# Patient Record
Sex: FEMALE | Race: WHITE | URBAN - METROPOLITAN AREA
[De-identification: names, ages, dates, MRNs, and addresses within clinical notes are randomized per-mention and may not be internally consistent; named-entity substitution may affect disease eponyms.]

---

## 2022-12-23 ENCOUNTER — OFFICE VISIT (OUTPATIENT)
Dept: URBAN - METROPOLITAN AREA CLINIC 113 | Facility: CLINIC | Age: 52
End: 2022-12-23
Payer: COMMERCIAL

## 2022-12-23 VITALS
HEART RATE: 93 BPM | WEIGHT: 253 LBS | SYSTOLIC BLOOD PRESSURE: 140 MMHG | HEIGHT: 65 IN | BODY MASS INDEX: 42.15 KG/M2 | TEMPERATURE: 97.7 F | DIASTOLIC BLOOD PRESSURE: 90 MMHG | RESPIRATION RATE: 20 BRPM

## 2022-12-23 DIAGNOSIS — R10.84 GENERALIZED ABDOMINAL PAIN: ICD-10-CM

## 2022-12-23 DIAGNOSIS — Z12.11 COLON CANCER SCREENING: ICD-10-CM

## 2022-12-23 DIAGNOSIS — K21.9 GASTROESOPHAGEAL REFLUX DISEASE WITHOUT ESOPHAGITIS: ICD-10-CM

## 2022-12-23 DIAGNOSIS — K80.20 CALCULUS OF GALLBLADDER WITHOUT CHOLECYSTITIS WITHOUT OBSTRUCTION: ICD-10-CM

## 2022-12-23 PROCEDURE — 99204 OFFICE O/P NEW MOD 45 MIN: CPT | Performed by: STUDENT IN AN ORGANIZED HEALTH CARE EDUCATION/TRAINING PROGRAM

## 2022-12-23 RX ORDER — PANTOPRAZOLE SODIUM 40 MG/1
1 TABLET TABLET, DELAYED RELEASE ORAL TWICE A DAY
Qty: 180 TABLET | Refills: 1

## 2022-12-23 RX ORDER — SODIUM PICOSULFATE, MAGNESIUM OXIDE, AND ANHYDROUS CITRIC ACID 10; 3.5; 12 MG/160ML; G/160ML; G/160ML
TAKE 160ML AS DIRECTED LIQUID ORAL
Qty: 1 KIT | Refills: 0 | OUTPATIENT
Start: 2022-12-23 | End: 2022-12-25

## 2022-12-23 NOTE — HPI-TODAY'S VISIT:
Initial visit 12/23/2022 Ms. Michael is a 52-year-old female with a medical history of reflux, migraine, obesity who presents to the GI clinic for evaluation of abdominal pain.  She describes pain in the epigastrium, left upper quadrant, bilateral lower quadrants.  She does admit to having acid reflux.  She admits to using a significant amount of NSAIDs in the form of Excedrin, BC powder, ibuprofen for treatment of back pain and headaches.  She denies weight loss associated with her pain.  She has alternating diarrhea and constipation.  She has never had a colonoscopy.  No known family history of colon cancer.  She denies rectal bleeding, dysphagia/odynophagia. She states she was seen in the ER at [facility] in September 2022 for abdominal pain and she was told she had a hiatal hernia and reflux as well as gallstones.  She went to a bariatric surgeon to discuss gastric sleeve surgery as well as cholecystectomy and was recommended to have her gallbladder removed, she went to see gastroenterology consultants of [place] who had recommended EGD prior to cholecystectomy.  Her insurance however did not allow her to get these procedures done and so she follows up in our clinic to discuss endoscopic evaluation. Labs 9/22/22: WBC 10.5, hemoglobin 15.8, platelet 358, INR 0.99, sodium 136 potassium 4.1, BUN 23, creatinine 0.88, lipase 112, LFTs within normal limits. CT abdomen/pelvis with contrast 9/22/2022 performed for epigastric abdominal pain notable for nonobstructing stone in the left renal pelvis measuring 1.5 cm with mild enhancement of surrounding ureter, cholelithiasis. Chest x-ray AP 9/22/2022 notable for no acute findings.

## 2022-12-23 NOTE — PHYSICAL EXAM GASTROINTESTINAL
Abdomen , soft, tender to palpation in the abdomen diffusely, Ortiz sign negative, nondistended , no guarding or rigidity , no masses palpable , normal bowel sounds , Liver and Spleen , no hepatomegaly present , no hepatosplenomegaly , liver nontender , spleen not palpable  90

## 2023-01-06 ENCOUNTER — TELEPHONE ENCOUNTER (OUTPATIENT)
Dept: URBAN - METROPOLITAN AREA CLINIC 113 | Facility: CLINIC | Age: 53
End: 2023-01-06

## 2023-01-10 ENCOUNTER — TELEPHONE ENCOUNTER (OUTPATIENT)
Dept: URBAN - METROPOLITAN AREA CLINIC 113 | Facility: CLINIC | Age: 53
End: 2023-01-10

## 2023-01-10 RX ORDER — SODIUM PICOSULFATE, MAGNESIUM OXIDE, AND ANHYDROUS CITRIC ACID 10; 3.5; 12 MG/160ML; G/160ML; G/160ML
TAKE 160ML AS DIRECTED LIQUID ORAL
Qty: 1 KIT | Refills: 0 | OUTPATIENT
Start: 2023-01-11 | End: 2023-01-13

## 2023-01-11 ENCOUNTER — ERX REFILL RESPONSE (OUTPATIENT)
Dept: URBAN - METROPOLITAN AREA CLINIC 113 | Facility: CLINIC | Age: 53
End: 2023-01-11

## 2023-01-11 RX ORDER — POLYETHYLENE GLYCOL 3350, SODIUM CHLORIDE, SODIUM BICARBONATE, POTASSIUM CHLORIDE 420; 11.2; 5.72; 1.48 G/4L; G/4L; G/4L; G/4L
AS DIRECTED POWDER, FOR SOLUTION ORAL AS DIRECTED
Qty: 1 KIT | Refills: 0 | OUTPATIENT

## 2023-01-11 RX ORDER — SODIUM PICOSULFATE, MAGNESIUM OXIDE, AND ANHYDROUS CITRIC ACID 10; 3.5; 12 MG/160ML; G/160ML; G/160ML
TAKE 160ML AS DIRECTED LIQUID ORAL
Qty: 1 KIT | Refills: 0 | OUTPATIENT

## 2023-01-13 ENCOUNTER — TELEPHONE ENCOUNTER (OUTPATIENT)
Dept: URBAN - METROPOLITAN AREA CLINIC 113 | Facility: CLINIC | Age: 53
End: 2023-01-13

## 2023-01-13 PROBLEM — 266435005: Status: ACTIVE | Noted: 2022-12-23

## 2023-01-31 ENCOUNTER — OFFICE VISIT (OUTPATIENT)
Dept: URBAN - METROPOLITAN AREA MEDICAL CENTER 2 | Facility: MEDICAL CENTER | Age: 53
End: 2023-01-31
Payer: COMMERCIAL

## 2023-01-31 DIAGNOSIS — K31.89 ACQUIRED DEFORMITY OF DUODENUM: ICD-10-CM

## 2023-01-31 DIAGNOSIS — R10.13 ABDOMINAL DISCOMFORT, EPIGASTRIC: ICD-10-CM

## 2023-01-31 DIAGNOSIS — D12.3 ADENOMA OF TRANSVERSE COLON: ICD-10-CM

## 2023-01-31 DIAGNOSIS — Z12.11 COLON CANCER SCREENING: ICD-10-CM

## 2023-01-31 PROCEDURE — 43239 EGD BIOPSY SINGLE/MULTIPLE: CPT | Performed by: STUDENT IN AN ORGANIZED HEALTH CARE EDUCATION/TRAINING PROGRAM

## 2023-01-31 PROCEDURE — 45385 COLONOSCOPY W/LESION REMOVAL: CPT | Performed by: STUDENT IN AN ORGANIZED HEALTH CARE EDUCATION/TRAINING PROGRAM

## 2023-01-31 RX ORDER — POLYETHYLENE GLYCOL 3350, SODIUM CHLORIDE, SODIUM BICARBONATE, POTASSIUM CHLORIDE 420; 11.2; 5.72; 1.48 G/4L; G/4L; G/4L; G/4L
AS DIRECTED POWDER, FOR SOLUTION ORAL AS DIRECTED
Qty: 1 KIT | Refills: 0 | Status: ACTIVE | COMMUNITY

## 2023-01-31 RX ORDER — PANTOPRAZOLE SODIUM 40 MG/1
1 TABLET TABLET, DELAYED RELEASE ORAL TWICE A DAY
Qty: 180 TABLET | Refills: 1 | Status: ACTIVE | COMMUNITY

## 2023-02-01 ENCOUNTER — ERX REFILL RESPONSE (OUTPATIENT)
Dept: URBAN - METROPOLITAN AREA CLINIC 113 | Facility: CLINIC | Age: 53
End: 2023-02-01

## 2023-02-01 RX ORDER — PANTOPRAZOLE SODIUM 40 MG/1
1 TABLET TABLET, DELAYED RELEASE ORAL TWICE A DAY
Qty: 180 TABLET | Refills: 1 | OUTPATIENT

## 2023-02-01 RX ORDER — PANTOPRAZOLE SODIUM 40 MG/1
TAKE 1 TABLET BY MOUTH TWICE A DAY FOR 90 DAYS TABLET, DELAYED RELEASE ORAL
Qty: 180 TABLET | Refills: 1 | OUTPATIENT

## 2023-02-15 ENCOUNTER — TELEPHONE ENCOUNTER (OUTPATIENT)
Dept: URBAN - METROPOLITAN AREA CLINIC 113 | Facility: CLINIC | Age: 53
End: 2023-02-15

## 2023-02-15 PROBLEM — 70342003: Status: ACTIVE | Noted: 2022-12-23

## 2023-03-20 ENCOUNTER — OFFICE VISIT (OUTPATIENT)
Dept: URBAN - METROPOLITAN AREA CLINIC 113 | Facility: CLINIC | Age: 53
End: 2023-03-20

## 2023-03-21 ENCOUNTER — OFFICE VISIT (OUTPATIENT)
Dept: URBAN - METROPOLITAN AREA CLINIC 113 | Facility: CLINIC | Age: 53
End: 2023-03-21

## 2023-03-28 ENCOUNTER — OFFICE VISIT (OUTPATIENT)
Dept: URBAN - METROPOLITAN AREA CLINIC 113 | Facility: CLINIC | Age: 53
End: 2023-03-28

## 2023-03-28 RX ORDER — PANTOPRAZOLE SODIUM 40 MG/1
TAKE 1 TABLET BY MOUTH TWICE A DAY FOR 90 DAYS TABLET, DELAYED RELEASE ORAL
Qty: 180 TABLET | Refills: 1 | Status: ACTIVE | COMMUNITY

## 2023-03-28 RX ORDER — POLYETHYLENE GLYCOL 3350, SODIUM CHLORIDE, SODIUM BICARBONATE, POTASSIUM CHLORIDE 420; 11.2; 5.72; 1.48 G/4L; G/4L; G/4L; G/4L
AS DIRECTED POWDER, FOR SOLUTION ORAL AS DIRECTED
Qty: 1 KIT | Refills: 0 | Status: ACTIVE | COMMUNITY

## 2023-04-21 ENCOUNTER — OFFICE VISIT (OUTPATIENT)
Dept: URBAN - METROPOLITAN AREA CLINIC 113 | Facility: CLINIC | Age: 53
End: 2023-04-21
Payer: COMMERCIAL

## 2023-04-21 VITALS
RESPIRATION RATE: 18 BRPM | BODY MASS INDEX: 39.62 KG/M2 | SYSTOLIC BLOOD PRESSURE: 109 MMHG | HEART RATE: 77 BPM | TEMPERATURE: 97.5 F | HEIGHT: 65 IN | DIASTOLIC BLOOD PRESSURE: 76 MMHG | WEIGHT: 237.8 LBS

## 2023-04-21 DIAGNOSIS — K59.00 CONSTIPATION, UNSPECIFIED CONSTIPATION TYPE: ICD-10-CM

## 2023-04-21 DIAGNOSIS — K21.9 GASTROESOPHAGEAL REFLUX DISEASE, UNSPECIFIED WHETHER ESOPHAGITIS PRESENT: ICD-10-CM

## 2023-04-21 PROBLEM — 14760008: Status: ACTIVE | Noted: 2023-04-21

## 2023-04-21 PROBLEM — 235595009: Status: ACTIVE | Noted: 2023-04-21

## 2023-04-21 PROCEDURE — 99214 OFFICE O/P EST MOD 30 MIN: CPT | Performed by: STUDENT IN AN ORGANIZED HEALTH CARE EDUCATION/TRAINING PROGRAM

## 2023-04-21 RX ORDER — POLYETHYLENE GLYCOL 3350, SODIUM CHLORIDE, SODIUM BICARBONATE, POTASSIUM CHLORIDE 420; 11.2; 5.72; 1.48 G/4L; G/4L; G/4L; G/4L
AS DIRECTED POWDER, FOR SOLUTION ORAL AS DIRECTED
Qty: 1 KIT | Refills: 0 | Status: ON HOLD | COMMUNITY

## 2023-04-21 RX ORDER — PANTOPRAZOLE SODIUM 40 MG/1
TAKE 1 TABLET BY MOUTH TWICE A DAY FOR 90 DAYS TABLET, DELAYED RELEASE ORAL
Qty: 180 TABLET | Refills: 1 | Status: ON HOLD | COMMUNITY

## 2023-04-21 RX ORDER — OMEPRAZOLE 40 MG/1
1 CAPSULE 30 MINUTES BEFORE MORNING AND EVENING MEAL CAPSULE, DELAYED RELEASE ORAL TWICE A DAY
Qty: 180 | Refills: 0 | OUTPATIENT
Start: 2023-04-21

## 2023-04-21 NOTE — HPI-TODAY'S VISIT:
Initial visit 12/23/2022 Ms. Michael is a 52-year-old female with a medical history of reflux, migraine, obesity who presents to the GI clinic for evaluation of abdominal pain.  She describes pain in the epigastrium, left upper quadrant, bilateral lower quadrants.  She does admit to having acid reflux.  She admits to using a significant amount of NSAIDs in the form of Excedrin, BC powder, ibuprofen for treatment of back pain and headaches.  She denies weight loss associated with her pain.  She has alternating diarrhea and constipation.  She has never had a colonoscopy.  No known family history of colon cancer.  She denies rectal bleeding, dysphagia/odynophagia. She states she was seen in the ER at [facility] in September 2022 for abdominal pain and she was told she had a hiatal hernia and reflux as well as gallstones.  She went to a bariatric surgeon to discuss gastric sleeve surgery as well as cholecystectomy and was recommended to have her gallbladder removed, she went to see gastroenterology consultants of [place] who had recommended EGD prior to cholecystectomy.  Her insurance however did not allow her to get these procedures done and so she follows up in our clinic to discuss endoscopic evaluation. Labs 9/22/22: WBC 10.5, hemoglobin 15.8, platelet 358, INR 0.99, sodium 136 potassium 4.1, BUN 23, creatinine 0.88, lipase 112, LFTs within normal limits. CT abdomen/pelvis with contrast 9/22/2022 performed for epigastric abdominal pain notable for nonobstructing stone in the left renal pelvis measuring 1.5 cm with mild enhancement of surrounding ureter, cholelithiasis. Chest x-ray AP 9/22/2022 notable for no acute findings. . Follow-up visit 4/21/2023 EGD 1/31/2023 performed for epigastric abdominal pain: Normal-appearing esophagus, Z-line regular at 33 cm, 5 cm hiatal hernia present, minimal inflammation in the gastric antrum, normal-appearing duodenum.  Duodenal biopsies with minute foci of pancreatic heterotopia with no other pathologic findings, unremarkable gastric biopsies. Colonoscopy 1/31/2023 performed for screening: JULI normal, TI normal, 8 mm pedunculated polyp removed in the transverse colon with cold snare, multiple medium mouth diverticula found in the sigmoid and descending colon, nonbleeding grade 1 hemorrhoids found on retroflexion.  Polyp consistent with tubular adenoma. She has had follow-up with Dr. Art for planned procedure.  Patient underwent robotic sleeve gastrectomy, hiatal hernia repair as well as cholecystectomy on 4/5/23. She states that she still has some chest pain with wheezing after meals, she was started on famotidine 40mg QHS. She complains of constipation with some straining during defecation.

## 2023-07-27 ENCOUNTER — OFFICE VISIT (OUTPATIENT)
Dept: URBAN - METROPOLITAN AREA CLINIC 113 | Facility: CLINIC | Age: 53
End: 2023-07-27

## 2023-08-08 ENCOUNTER — ERX REFILL RESPONSE (OUTPATIENT)
Dept: URBAN - METROPOLITAN AREA CLINIC 113 | Facility: CLINIC | Age: 53
End: 2023-08-08

## 2023-08-08 RX ORDER — OMEPRAZOLE 40 MG/1
TAKE 1 CAPSULE 30 MINUTES BEFORE MORNING AND EVENING MEAL ORALLY CAPSULE, DELAYED RELEASE ORAL
Qty: 60 CAPSULE | Refills: 2 | OUTPATIENT

## 2023-08-08 RX ORDER — OMEPRAZOLE 40 MG/1
1 CAPSULE 30 MINUTES BEFORE MORNING AND EVENING MEAL CAPSULE, DELAYED RELEASE ORAL TWICE A DAY
Qty: 180 | Refills: 0 | OUTPATIENT

## 2023-09-18 ENCOUNTER — DASHBOARD ENCOUNTERS (OUTPATIENT)
Age: 53
End: 2023-09-18

## 2023-09-18 ENCOUNTER — OFFICE VISIT (OUTPATIENT)
Dept: URBAN - METROPOLITAN AREA CLINIC 113 | Facility: CLINIC | Age: 53
End: 2023-09-18
Payer: COMMERCIAL

## 2023-09-18 VITALS
DIASTOLIC BLOOD PRESSURE: 68 MMHG | HEART RATE: 66 BPM | BODY MASS INDEX: 37.02 KG/M2 | WEIGHT: 222.2 LBS | HEIGHT: 65 IN | RESPIRATION RATE: 18 BRPM | SYSTOLIC BLOOD PRESSURE: 104 MMHG | TEMPERATURE: 97.1 F

## 2023-09-18 DIAGNOSIS — R10.13 EPIGASTRIC PAIN: ICD-10-CM

## 2023-09-18 DIAGNOSIS — K21.9 GASTROESOPHAGEAL REFLUX DISEASE, UNSPECIFIED WHETHER ESOPHAGITIS PRESENT: ICD-10-CM

## 2023-09-18 PROCEDURE — 99214 OFFICE O/P EST MOD 30 MIN: CPT | Performed by: STUDENT IN AN ORGANIZED HEALTH CARE EDUCATION/TRAINING PROGRAM

## 2023-09-18 RX ORDER — POLYETHYLENE GLYCOL 3350, SODIUM CHLORIDE, SODIUM BICARBONATE, POTASSIUM CHLORIDE 420; 11.2; 5.72; 1.48 G/4L; G/4L; G/4L; G/4L
AS DIRECTED POWDER, FOR SOLUTION ORAL AS DIRECTED
Qty: 1 KIT | Refills: 0 | Status: ON HOLD | COMMUNITY

## 2023-09-18 RX ORDER — PANTOPRAZOLE SODIUM 40 MG/1
TAKE 1 TABLET BY MOUTH TWICE A DAY FOR 90 DAYS TABLET, DELAYED RELEASE ORAL
Qty: 180 TABLET | Refills: 1 | Status: ON HOLD | COMMUNITY

## 2023-09-18 RX ORDER — OMEPRAZOLE 40 MG/1
TAKE 1 CAPSULE 30 MINUTES BEFORE MORNING AND EVENING MEAL ORALLY CAPSULE, DELAYED RELEASE ORAL
Qty: 60 CAPSULE | Refills: 2 | Status: ACTIVE | COMMUNITY

## 2023-09-18 NOTE — HPI-TODAY'S VISIT:
Initial visit 12/23/2022 Ms. Michael is a 52-year-old female with a medical history of reflux, migraine, obesity who presents to the GI clinic for evaluation of abdominal pain.  She describes pain in the epigastrium, left upper quadrant, bilateral lower quadrants.  She does admit to having acid reflux.  She admits to using a significant amount of NSAIDs in the form of Excedrin, BC powder, ibuprofen for treatment of back pain and headaches.  She denies weight loss associated with her pain.  She has alternating diarrhea and constipation.  She has never had a colonoscopy.  No known family history of colon cancer.  She denies rectal bleeding, dysphagia/odynophagia. She states she was seen in the ER at [facility] in September 2022 for abdominal pain and she was told she had a hiatal hernia and reflux as well as gallstones.  She went to a bariatric surgeon to discuss gastric sleeve surgery as well as cholecystectomy and was recommended to have her gallbladder removed, she went to see gastroenterology consultants of [place] who had recommended EGD prior to cholecystectomy.  Her insurance however did not allow her to get these procedures done and so she follows up in our clinic to discuss endoscopic evaluation. Labs 9/22/22: WBC 10.5, hemoglobin 15.8, platelet 358, INR 0.99, sodium 136 potassium 4.1, BUN 23, creatinine 0.88, lipase 112, LFTs within normal limits. CT abdomen/pelvis with contrast 9/22/2022 performed for epigastric abdominal pain notable for nonobstructing stone in the left renal pelvis measuring 1.5 cm with mild enhancement of surrounding ureter, cholelithiasis. Chest x-ray AP 9/22/2022 notable for no acute findings. . Follow-up visit 4/21/2023 EGD 1/31/2023 performed for epigastric abdominal pain: Normal-appearing esophagus, Z-line regular at 33 cm, 5 cm hiatal hernia present, minimal inflammation in the gastric antrum, normal-appearing duodenum.  Duodenal biopsies with minute foci of pancreatic heterotopia with no other pathologic findings, unremarkable gastric biopsies. Colonoscopy 1/31/2023 performed for screening: JULI normal, TI normal, 8 mm pedunculated polyp removed in the transverse colon with cold snare, multiple medium mouth diverticula found in the sigmoid and descending colon, nonbleeding grade 1 hemorrhoids found on retroflexion.  Polyp consistent with tubular adenoma. She has had follow-up with Dr. Art for planned procedure.  Patient underwent robotic sleeve gastrectomy, hiatal hernia repair as well as cholecystectomy on 4/5/23. She states that she still has some chest pain with wheezing after meals, she was started on famotidine 40mg QHS. She complains of constipation with some straining during defecation. . Follow-up visit 9/18/2023 Patient states that she continues to have epigastric pain and reflux that is worse in the postprandial state.  She has been using PPI twice daily without symptom improve. She has lost 37 pounds since her gastric sleeve, hiatal hernia repair and cholecysetctomy. She also complains of post-prandial regurgitation, this is worse at night and happens if she falls asleep soon after eating.

## 2023-09-25 ENCOUNTER — TELEPHONE ENCOUNTER (OUTPATIENT)
Dept: URBAN - METROPOLITAN AREA CLINIC 113 | Facility: CLINIC | Age: 53
End: 2023-09-25

## 2023-09-28 ENCOUNTER — TELEPHONE ENCOUNTER (OUTPATIENT)
Dept: URBAN - METROPOLITAN AREA CLINIC 113 | Facility: CLINIC | Age: 53
End: 2023-09-28

## 2023-09-29 ENCOUNTER — LAB OUTSIDE AN ENCOUNTER (OUTPATIENT)
Dept: URBAN - METROPOLITAN AREA CLINIC 113 | Facility: CLINIC | Age: 53
End: 2023-09-29

## 2023-09-29 ENCOUNTER — OUT OF OFFICE VISIT (OUTPATIENT)
Dept: URBAN - METROPOLITAN AREA SURGERY CENTER 25 | Facility: SURGERY CENTER | Age: 53
End: 2023-09-29
Payer: COMMERCIAL

## 2023-09-29 ENCOUNTER — TELEPHONE ENCOUNTER (OUTPATIENT)
Dept: URBAN - METROPOLITAN AREA CLINIC 113 | Facility: CLINIC | Age: 53
End: 2023-09-29

## 2023-09-29 ENCOUNTER — CLAIMS CREATED FROM THE CLAIM WINDOW (OUTPATIENT)
Dept: URBAN - METROPOLITAN AREA CLINIC 4 | Facility: CLINIC | Age: 53
End: 2023-09-29
Payer: COMMERCIAL

## 2023-09-29 DIAGNOSIS — K31.89 REACTIVE GASTROPATHY: ICD-10-CM

## 2023-09-29 DIAGNOSIS — Z98.84 BARIATRIC SURG STAT-UNSP: ICD-10-CM

## 2023-09-29 DIAGNOSIS — T47.8X5A ADVERSE EFFECT OF OTHER AGENTS PRIMARILY AFFECTING GASTROINTESTINAL SYSTEM, INITIAL ENCOUNTER: ICD-10-CM

## 2023-09-29 DIAGNOSIS — R12 BURNING REFLUX: ICD-10-CM

## 2023-09-29 DIAGNOSIS — Z98.84 BARIATRIC SURGERY STATUS: ICD-10-CM

## 2023-09-29 DIAGNOSIS — K29.70 GASTRITIS, UNSPECIFIED, WITHOUT BLEEDING: ICD-10-CM

## 2023-09-29 DIAGNOSIS — R12 HEARTBURN: ICD-10-CM

## 2023-09-29 PROCEDURE — G8907 PT DOC NO EVENTS ON DISCHARG: HCPCS | Performed by: STUDENT IN AN ORGANIZED HEALTH CARE EDUCATION/TRAINING PROGRAM

## 2023-09-29 PROCEDURE — 00731 ANES UPR GI NDSC PX NOS: CPT | Performed by: ANESTHESIOLOGY

## 2023-09-29 PROCEDURE — 43239 EGD BIOPSY SINGLE/MULTIPLE: CPT | Performed by: STUDENT IN AN ORGANIZED HEALTH CARE EDUCATION/TRAINING PROGRAM

## 2023-09-29 PROCEDURE — 88312 SPECIAL STAINS GROUP 1: CPT | Performed by: PATHOLOGY

## 2023-09-29 PROCEDURE — 88305 TISSUE EXAM BY PATHOLOGIST: CPT | Performed by: PATHOLOGY

## 2023-09-29 PROCEDURE — 00731 ANES UPR GI NDSC PX NOS: CPT | Performed by: ANESTHESIOLOGIST ASSISTANT

## 2023-09-29 RX ORDER — PANTOPRAZOLE SODIUM 40 MG/1
TAKE 1 TABLET BY MOUTH TWICE A DAY FOR 90 DAYS TABLET, DELAYED RELEASE ORAL
Qty: 180 TABLET | Refills: 1 | Status: ON HOLD | COMMUNITY

## 2023-09-29 RX ORDER — POLYETHYLENE GLYCOL 3350, SODIUM CHLORIDE, SODIUM BICARBONATE, POTASSIUM CHLORIDE 420; 11.2; 5.72; 1.48 G/4L; G/4L; G/4L; G/4L
AS DIRECTED POWDER, FOR SOLUTION ORAL AS DIRECTED
Qty: 1 KIT | Refills: 0 | Status: ON HOLD | COMMUNITY

## 2023-09-29 RX ORDER — OMEPRAZOLE 40 MG/1
TAKE 1 CAPSULE 30 MINUTES BEFORE MORNING AND EVENING MEAL ORALLY CAPSULE, DELAYED RELEASE ORAL
Qty: 60 CAPSULE | Refills: 2 | Status: ACTIVE | COMMUNITY

## 2023-10-02 ENCOUNTER — OFFICE VISIT (OUTPATIENT)
Dept: URBAN - METROPOLITAN AREA CLINIC 112 | Facility: CLINIC | Age: 53
End: 2023-10-02

## 2023-10-09 ENCOUNTER — TELEPHONE ENCOUNTER (OUTPATIENT)
Dept: URBAN - METROPOLITAN AREA CLINIC 113 | Facility: CLINIC | Age: 53
End: 2023-10-09

## 2023-10-09 RX ORDER — OMEPRAZOLE 40 MG/1
TAKE 1 CAPSULE 30 MINUTES BEFORE MORNING AND EVENING MEAL ORALLY CAPSULE, DELAYED RELEASE ORAL
OUTPATIENT

## 2023-10-09 RX ORDER — PANTOPRAZOLE SODIUM 40 MG/1
TAKE 1 TABLET BY MOUTH TWICE A DAY FOR 90 DAYS TABLET, DELAYED RELEASE ORAL
OUTPATIENT

## 2023-10-09 RX ORDER — DEXLANSOPRAZOLE 30 MG/1
1 CAPSULE CAPSULE, DELAYED RELEASE ORAL ONCE A DAY
Qty: 90 CAPSULE | Refills: 1 | OUTPATIENT
Start: 2023-10-13

## 2023-11-29 ENCOUNTER — OFFICE VISIT (OUTPATIENT)
Dept: URBAN - METROPOLITAN AREA CLINIC 113 | Facility: CLINIC | Age: 53
End: 2023-11-29

## 2023-11-29 NOTE — HPI-TODAY'S VISIT:
Initial visit 12/23/2022 Ms. Michael is a 52-year-old female with a medical history of reflux, migraine, obesity who presents to the GI clinic for evaluation of abdominal pain.  She describes pain in the epigastrium, left upper quadrant, bilateral lower quadrants.  She does admit to having acid reflux.  She admits to using a significant amount of NSAIDs in the form of Excedrin, BC powder, ibuprofen for treatment of back pain and headaches.  She denies weight loss associated with her pain.  She has alternating diarrhea and constipation.  She has never had a colonoscopy.  No known family history of colon cancer.  She denies rectal bleeding, dysphagia/odynophagia. She states she was seen in the ER at [facility] in September 2022 for abdominal pain and she was told she had a hiatal hernia and reflux as well as gallstones.  She went to a bariatric surgeon to discuss gastric sleeve surgery as well as cholecystectomy and was recommended to have her gallbladder removed, she went to see gastroenterology consultants of [place] who had recommended EGD prior to cholecystectomy.  Her insurance however did not allow her to get these procedures done and so she follows up in our clinic to discuss endoscopic evaluation. Labs 9/22/22: WBC 10.5, hemoglobin 15.8, platelet 358, INR 0.99, sodium 136 potassium 4.1, BUN 23, creatinine 0.88, lipase 112, LFTs within normal limits. CT abdomen/pelvis with contrast 9/22/2022 performed for epigastric abdominal pain notable for nonobstructing stone in the left renal pelvis measuring 1.5 cm with mild enhancement of surrounding ureter, cholelithiasis. Chest x-ray AP 9/22/2022 notable for no acute findings. . Follow-up visit 4/21/2023 EGD 1/31/2023 performed for epigastric abdominal pain: Normal-appearing esophagus, Z-line regular at 33 cm, 5 cm hiatal hernia present, minimal inflammation in the gastric antrum, normal-appearing duodenum.  Duodenal biopsies with minute foci of pancreatic heterotopia with no other pathologic findings, unremarkable gastric biopsies. Colonoscopy 1/31/2023 performed for screening: JULI normal, TI normal, 8 mm pedunculated polyp removed in the transverse colon with cold snare, multiple medium mouth diverticula found in the sigmoid and descending colon, nonbleeding grade 1 hemorrhoids found on retroflexion.  Polyp consistent with tubular adenoma. She has had follow-up with Dr. Art for planned procedure.  Patient underwent robotic sleeve gastrectomy, hiatal hernia repair as well as cholecystectomy on 4/5/23. She states that she still has some chest pain with wheezing after meals, she was started on famotidine 40mg QHS. She complains of constipation with some straining during defecation. . Follow-up visit 9/18/2023 Patient states that she continues to have epigastric pain and reflux that is worse in the postprandial state.  She has been using PPI twice daily without symptom improve. She has lost 37 pounds since her gastric sleeve, hiatal hernia repair and cholecysetctomy. She also complains of post-prandial regurgitation, this is worse at night and happens if she falls asleep soon after eating. Follow-up visit, 11/29/2023:53-year-old female presents for follow-up.  She was last seen 9/18/2023.  She was planned for EGD with Bravo.  We would consider Kwabena-en-Y gastric bypass if her symptoms can be attributed to reflux. EGD 9/29/2023:Performed without difficulty.  Normal esophagus.  Regular Z-line, 38 cm from the incisors.  A sleeve gastrectomy was found, characterized by healthy-appearing mucosa, biopsied.  Pathology revealed chemical/reactive gastropathy and PPI effect, negative for H. pylori or intestinal metaplasia.  Normal duodenum.  5 cm hiatal hernia.  She is recommended esophageal motility and pH study as Bravo could not be performed secondary to suspected nickel allergy. Hospitals not currently performing the test.  This was later faxed to [facility] and she was started on Dexilant.  She requested  a referral to an allergist regarding her nickel allergy.  She was advised to follow-up with her PCP for the referral.

## 2023-12-08 ENCOUNTER — OFFICE VISIT (OUTPATIENT)
Dept: URBAN - METROPOLITAN AREA MEDICAL CENTER 19 | Facility: MEDICAL CENTER | Age: 53
End: 2023-12-08

## 2024-06-13 ENCOUNTER — OFFICE VISIT (OUTPATIENT)
Dept: URBAN - METROPOLITAN AREA CLINIC 113 | Facility: CLINIC | Age: 54
End: 2024-06-13
Payer: COMMERCIAL

## 2024-06-13 VITALS
BODY MASS INDEX: 32.65 KG/M2 | HEIGHT: 65 IN | DIASTOLIC BLOOD PRESSURE: 77 MMHG | WEIGHT: 196 LBS | TEMPERATURE: 97.9 F | RESPIRATION RATE: 18 BRPM | SYSTOLIC BLOOD PRESSURE: 104 MMHG | HEART RATE: 92 BPM

## 2024-06-13 DIAGNOSIS — K21.9 GASTROESOPHAGEAL REFLUX DISEASE WITHOUT ESOPHAGITIS: ICD-10-CM

## 2024-06-13 DIAGNOSIS — N20.0 NEPHROLITHIASIS: ICD-10-CM

## 2024-06-13 DIAGNOSIS — K64.9 HEMORRHOIDS, UNSPECIFIED HEMORRHOID TYPE: ICD-10-CM

## 2024-06-13 PROBLEM — 95570007: Status: ACTIVE | Noted: 2024-06-13

## 2024-06-13 PROCEDURE — 99214 OFFICE O/P EST MOD 30 MIN: CPT | Performed by: STUDENT IN AN ORGANIZED HEALTH CARE EDUCATION/TRAINING PROGRAM

## 2024-06-13 RX ORDER — RIMEGEPANT SULFATE 75 MG/75MG
1 TABLET ON THE TONGUE AND ALLOW TO DISSOLVE TABLET, ORALLY DISINTEGRATING ORAL
Status: ACTIVE | COMMUNITY

## 2024-06-13 RX ORDER — DEXLANSOPRAZOLE 30 MG/1
1 CAPSULE CAPSULE, DELAYED RELEASE ORAL ONCE A DAY
Qty: 90 CAPSULE | Refills: 1 | Status: ON HOLD | COMMUNITY
Start: 2023-10-13

## 2024-06-13 RX ORDER — POLYETHYLENE GLYCOL 3350, SODIUM CHLORIDE, SODIUM BICARBONATE, POTASSIUM CHLORIDE 420; 11.2; 5.72; 1.48 G/4L; G/4L; G/4L; G/4L
AS DIRECTED POWDER, FOR SOLUTION ORAL AS DIRECTED
Qty: 1 KIT | Refills: 0 | Status: ON HOLD | COMMUNITY

## 2024-06-13 RX ORDER — HYDROCORTISONE 25 MG/G
1 APPLICATION CREAM TOPICAL TWICE A DAY
Qty: 1 KIT | Refills: 0 | OUTPATIENT
Start: 2024-06-13 | End: 2024-06-23

## 2024-06-13 RX ORDER — PANTOPRAZOLE SODIUM 40 MG/1
1 TABLET TABLET, DELAYED RELEASE ORAL ONCE A DAY
Status: ACTIVE | COMMUNITY

## 2024-06-13 RX ORDER — TAMSULOSIN HYDROCHLORIDE 0.4 MG/1
1 CAPSULE CAPSULE ORAL ONCE A DAY
Status: ACTIVE | COMMUNITY

## 2024-06-13 NOTE — HPI-TODAY'S VISIT:
Initial visit 12/23/2022 Ms. Michael is a 52-year-old female with a medical history of reflux, migraine, obesity who presents to the GI clinic for evaluation of abdominal pain.  She describes pain in the epigastrium, left upper quadrant, bilateral lower quadrants.  She does admit to having acid reflux.  She admits to using a significant amount of NSAIDs in the form of Excedrin, BC powder, ibuprofen for treatment of back pain and headaches.  She denies weight loss associated with her pain.  She has alternating diarrhea and constipation.  She has never had a colonoscopy.  No known family history of colon cancer.  She denies rectal bleeding, dysphagia/odynophagia. She states she was seen in the ER at [facility] in September 2022 for abdominal pain and she was told she had a hiatal hernia and reflux as well as gallstones.  She went to a bariatric surgeon to discuss gastric sleeve surgery as well as cholecystectomy and was recommended to have her gallbladder removed, she went to see gastroenterology consultants of [place] who had recommended EGD prior to cholecystectomy.  Her insurance however did not allow her to get these procedures done and so she follows up in our clinic to discuss endoscopic evaluation. Labs 9/22/22: WBC 10.5, hemoglobin 15.8, platelet 358, INR 0.99, sodium 136 potassium 4.1, BUN 23, creatinine 0.88, lipase 112, LFTs within normal limits. CT abdomen/pelvis with contrast 9/22/2022 performed for epigastric abdominal pain notable for nonobstructing stone in the left renal pelvis measuring 1.5 cm with mild enhancement of surrounding ureter, cholelithiasis. Chest x-ray AP 9/22/2022 notable for no acute findings. . Follow-up visit 4/21/2023 EGD 1/31/2023 performed for epigastric abdominal pain: Normal-appearing esophagus, Z-line regular at 33 cm, 5 cm hiatal hernia present, minimal inflammation in the gastric antrum, normal-appearing duodenum.  Duodenal biopsies with minute foci of pancreatic heterotopia with no other pathologic findings, unremarkable gastric biopsies. Colonoscopy 1/31/2023 performed for screening: JULI normal, TI normal, 8 mm pedunculated polyp removed in the transverse colon with cold snare, multiple medium mouth diverticula found in the sigmoid and descending colon, nonbleeding grade 1 hemorrhoids found on retroflexion.  Polyp consistent with tubular adenoma. She has had follow-up with Dr. Art for planned procedure.  Patient underwent robotic sleeve gastrectomy, hiatal hernia repair as well as cholecystectomy on 4/5/23. She states that she still has some chest pain with wheezing after meals, she was started on famotidine 40mg QHS. She complains of constipation with some straining during defecation. . Follow-up visit 9/18/2023 Patient states that she continues to have epigastric pain and reflux that is worse in the postprandial state.  She has been using PPI twice daily without symptom improve. She has lost 37 pounds since her gastric sleeve, hiatal hernia repair and cholecysetctomy. She also complains of post-prandial regurgitation, this is worse at night and happens if she falls asleep soon after eating. Follow-up visit, 11/29/2023:53-year-old female presents for follow-up.  She was last seen 9/18/2023.  She was planned for EGD with Bravo.  We would consider Kwabena-en-Y gastric bypass if her symptoms can be attributed to reflux. EGD 9/29/2023:Performed without difficulty.  Normal esophagus.  Regular Z-line, 38 cm from the incisors.  A sleeve gastrectomy was found, characterized by healthy-appearing mucosa, biopsied.  Pathology revealed chemical/reactive gastropathy and PPI effect, negative for H. pylori or intestinal metaplasia.  Normal duodenum.  5 cm hiatal hernia.  She is recommended esophageal motility and pH study as Bravo could not be performed secondary to suspected nickel allergy. Hospitals not currently performing the test.  This was later faxed to [facility] and she was started on Dexilant.  She requested  a referral to an allergist regarding her nickel allergy.  She was advised to follow-up with her PCP for the referral. . Follow up 6/13/24 Since her last visit she has had abdominal pain related to kidney stones, she attempted lithotripsy that was unsuccessful she is being planned for stone removal in the future by urology. She has developed rectal irritation from what she suspects are hemorrhoids. She continues to have post-prandial abdominal discomfort and reflux, she is currently using pantoprazole daily.

## 2024-06-24 ENCOUNTER — ERX REFILL RESPONSE (OUTPATIENT)
Dept: URBAN - METROPOLITAN AREA CLINIC 113 | Facility: CLINIC | Age: 54
End: 2024-06-24

## 2024-06-24 RX ORDER — HYDROCORTISONE 25 MG/G
APPLY 1 APPLICATION EXTERNALLY TWICE A DAY FOR 10 DAYS CREAM TOPICAL
Qty: 30 GRAM | Refills: 0 | OUTPATIENT

## 2024-08-15 ENCOUNTER — OFFICE VISIT (OUTPATIENT)
Dept: URBAN - METROPOLITAN AREA CLINIC 113 | Facility: CLINIC | Age: 54
End: 2024-08-15